# Patient Record
Sex: FEMALE | Employment: UNEMPLOYED | URBAN - NONMETROPOLITAN AREA
[De-identification: names, ages, dates, MRNs, and addresses within clinical notes are randomized per-mention and may not be internally consistent; named-entity substitution may affect disease eponyms.]

---

## 2019-11-19 ENCOUNTER — OFFICE VISIT (OUTPATIENT)
Dept: OTOLARYNGOLOGY | Facility: CLINIC | Age: 59
End: 2019-11-19

## 2019-11-19 ENCOUNTER — PROCEDURE VISIT (OUTPATIENT)
Dept: OTOLARYNGOLOGY | Facility: CLINIC | Age: 59
End: 2019-11-19

## 2019-11-19 VITALS
HEART RATE: 80 BPM | BODY MASS INDEX: 17.68 KG/M2 | HEIGHT: 66 IN | TEMPERATURE: 98.3 F | SYSTOLIC BLOOD PRESSURE: 145 MMHG | WEIGHT: 110 LBS | DIASTOLIC BLOOD PRESSURE: 85 MMHG

## 2019-11-19 DIAGNOSIS — M26.622 ARTHRALGIA OF LEFT TEMPOROMANDIBULAR JOINT: ICD-10-CM

## 2019-11-19 DIAGNOSIS — H90.3 SNHL (SENSORY-NEURAL HEARING LOSS), ASYMMETRICAL: Primary | ICD-10-CM

## 2019-11-19 DIAGNOSIS — M79.18 MYOFASCIAL PAIN ON LEFT SIDE: Primary | ICD-10-CM

## 2019-11-19 DIAGNOSIS — H90.3 HEARING LOSS, SENSORINEURAL, HIGH FREQUENCY, BILATERAL: ICD-10-CM

## 2019-11-19 DIAGNOSIS — H92.02 EAR PAIN, LEFT: ICD-10-CM

## 2019-11-19 DIAGNOSIS — H92.02 OTALGIA, LEFT: ICD-10-CM

## 2019-11-19 PROCEDURE — 99203 OFFICE O/P NEW LOW 30 MIN: CPT | Performed by: OTOLARYNGOLOGY

## 2019-11-19 NOTE — PROGRESS NOTES
Vianca Abel LPN   Patient Intake Note    Review of Systems  Review of Systems   Constitutional: Negative for chills, fatigue and fever.   HENT:        See HPI   Respiratory: Negative for cough, choking and shortness of breath.    Gastrointestinal: Negative for diarrhea, nausea and vomiting.   Neurological: Negative for dizziness, light-headedness and headaches.   Psychiatric/Behavioral: Negative for sleep disturbance.       QUALITY MEASURES    Tobacco Use: Screening and Cessation Intervention  Social History    Tobacco Use      Smoking status: Never Smoker      Smokeless tobacco: Never Used        Vianca Abel LPN  11/19/2019  3:05 PM

## 2019-11-19 NOTE — PROGRESS NOTES
Aren Ashley Jr, MD     Chief Complaint   Patient presents with   • Ear Problem     Pain and itching in right ear        HISTORY OF PRESENT ILLNESS:   Accompanied by:     Irvin Henning is a  59 y.o. female with L ear pain.  She had this last month. She denies any antecedent illness.  She has seen Dentist and told teeth ok. She states jaw not checked by dentist.  PCP treated with antibiotics.  No pain today.  She has intermittent pain L face. She will feel pain in L ear.  Hearing- tested today. No change with pain.  Smoke- none  Drink- none  Bruxing- she is not sure.  She has some issues with chewing.  She has itching ears. She cleans ears    Review of Systems  Reviewed per patient intake note and confirmed with patient    Past History:  History reviewed. No pertinent past medical history.  History reviewed. No pertinent surgical history.  Family History   Problem Relation Age of Onset   • Cancer Brother      Social History     Tobacco Use   • Smoking status: Never Smoker   • Smokeless tobacco: Never Used   Substance Use Topics   • Alcohol use: No     Frequency: Never   • Drug use: Defer     No outpatient medications have been marked as taking for the 11/19/19 encounter (Office Visit) with Aren Ashley Jr., MD.     Allergies:  Patient has no known allergies.        Vital Signs:   Temp:  [98.3 °F (36.8 °C)] 98.3 °F (36.8 °C)  Heart Rate:  [80] 80  BP: (145)/(85) 145/85    EXAMINATION:   CONSTITUTIONAL:    well nourished, well-developed, alert, oriented, in no acute distress     BODY HABITUS:    Normal body habitus    COMMUNICATION:    able to communicate normally, normal voice quality    HEAD:     Normocephalic, without obvious abnormality, atraumatic    FACE:    structure normal, no tenderness present, no lesions/masses, no evidence of trauma    SALIVARY GLANDS:    parotid glands with no tenderness, no swelling, no masses, submandibular glands with normal size, nontender     EYE:    ocular  motility normal, eyelids normal, orbits normal, no proptosis, conjunctiva clear, sclera non-icteric, pupils equal, round, reactive to light and accomodation  Color:   brown    HEARING:    response to conversational voice normal    EARS:    Otoscopic exam   normal pinna with no lesions, Canals normal size and shape, Tympanic membranes normal, Ossicular chain intact, Middle ear clear   EXTERNAL AUDITORY CANALS:           BILATERAL: too clean but otherwise normal     NOSE EXTERNAL:    APPEARANCE: normal, straight, with good projection, no tenderness, no lesions, no tenderness, good nasal support, patent nares    NOSE INTERNAL:    Anterior rhinoscopy  intact mucosa, normal inferior turbinates, septum midline without perforation, secretions clear and normal amount, nares patent, normal nasal airway without obstruction, no lesions    ORAL CAVITY:    Normal lips with no lesions, dentition normal for age, FOM intact without lesions and normal salivary flow, Mucosa intact without lesions, Hard and soft palate normal without lesions    OROPHARYNX:    Direct examination  oropharyngeal mucosa normal, tonsil(s) with normal appearance      NECK:    normal appearance, no masses, no lesions, larynx normal mobility, trachea midline    LYMPH NODES :    no adenopathy    THYROID:    no overt thyromegaly, no tenderness, nodules or mass present on palpation, position midline    CHEST/RESPIRATORY:    respiratory effort normal, no rales, rubs or wheezing, no stridor, normal appearance to chest    CARDIOVASCULAR:    regular rate and rhythm, no murmurs, gallups, no peripheral edema    NEURO/PSYCHIATRIC :    oriented appropriately for age, mood normal, affect appropriate, cranial nerves intact grossly (unless specifically described), gait normal for age     TMJ EXAM:  Tenderness:     Left  Opening:     Deviation Bilateral- none   Clicking/Popping:     Bilateral- none   Pain radiation:     Left- into MM groove  OCCLUSION: Class I:        Bilateral    RESULTS REVIEW:    Audiologic testing reviewed.        ASSESSMENT:      Diagnosis Plan   1. Myofascial pain on left side      Mastication   2. Arthralgia of left temporomandibular joint      Tenderness on Exam   3. Otalgia, left      referred from TMJ   4. Hearing loss, sensorineural, high frequency, bilateral      discussed Hearing aids.           PLAN:     Conservative management.  patient has tenderness over TMJ upon exam.  I recommend TMJ regimen, heat and Aleve.  I will have patient try Mouthguard.  She may need referral back to Dentist for splint if pain persists.  EACs too clean. I will start ear care and see if this resolves itching.  Ear care with oil  TMJ recommendations  Aleve BID  Heat  Mouthguard  Consider hearing aids for hearing  MY CHART:  Patient is Encouraged to enroll in My Chart  Encouraged to review data and findings in My Chart             Patient, Spouse understand(s) and agree(s) with the treatment plan as described.  Return in about 6 weeks (around 12/31/2019) for Recheck L ear pain.       Aren Ashley Jr, MD  11/19/19  3:21 PM

## 2019-11-19 NOTE — PATIENT INSTRUCTIONS
TEMPOROMANDIBULAR JOINT EXERCISES     The temporomandibular joint, or the TMJ, is the jaw joint. This joint can frequently be a source of pain in the head and neck region. Typically because of its location, pain is felt either in area just in front of the ear, or in the ear itself. However, pain in the TMJ can be referred to any part of the head and neck.     An examination by the physician frequently reveals tenderness around the joint. Oftentimes, a crack or pop can also be felt. This may be an indication that the pain is in all actuality coming from the joint. An exhaustive search for a cause is carried out, in an effort to determine the etiology of the pain. Pain can be caused by grinding of teeth at night (bruxism), overuse (gum chewing, ice cracking, over opening mouth), poor dentition and malocclusion (bad bite). In an attempt to be thorough, your physician may involve others who have experience in this field, such as oral surgeons, dentists and pain experts.     Should you be diagnosed with TMJ pain, a course of conservative treatment is indicated, as this works in an overwhelming majority of cases. Because this pain originates from a joint, the treatment is similar to treatment for pain in other joints.  Treatment     Rest:  This is indicated to relieve the pressure on the joint. No chewing gum, tough meat, hard bread, cracking ice in the teeth, or any other activity that places undue stress on the joint. Simply, if it causes it to hurt, stop doing it. This may be required for an unspecified period of time, usually 1 to 2 weeks.     Cold to the area:  This will reduce swelling and pain early in the course.     Heat to the area:  This improves blood flow to the area and speeds healing.     Pain Relievers:  Anti-inflammatory medications, such as aspirin, Motrin, Advil, Nuprin, Aleve or any other products in this category are satisfactory to use in the face of joint pain. Rarely are narcotics required, except  possibly in the acute phase to gain some initial relief.  Dr. Ashley may administer pain blocks to relieve severe pain and spasm. Nerve blocks may also be used.    Recommendations:  ?       Reduce/eliminate caffeinated drinks  ?       Reduce high sugar drinks and foods  ?       Get plenty of rest  ?       Practice relaxation techniques; Yoga, Biofeedback, Freddie Chi, etc.  ?       Exercise for overall fitness  ?       Eat healthy- High fiber, low fat/cholesterol eating, and change eating habits. We recommend Sugar Busters as a lifestyle change for eating.  ?       See your dentist regularly for checkups and bite checks.  Rehabilitation:  Once the acute pain has been controlled, you should begin exercises to strengthen and rehabilitate the TMJ.     Exercises: These should be performed for five minutes at least five times each day     Slowly open the mouth to its fullest extent, even using the fingers to exert gentle pressure.     Open and close the mouth as widely and rapidly as possible.     Open and close the mouth against resistance by placing the open palm underneath the chin, or the fingers on top of the chin.     Move the lower jaw side to side without resistance. Later, add resistance by placing both hands on either side of the jaw.     Push (protrude) the lower jaw without resistance. Later add resistance.     Should the above regimen fail to lead to improvement, your physician will discuss with you other forms of therapy. Since almost all types of TMJ pain respond to conservative therapy, surgery is indicated only after exhausting the rehabilitation. Dr. Ashley does not perform rehabilitative surgery on the temporomandibular joint, but refers patients to an oral surgery who specialize in this type of surgery.     APPLICATION OF HEAT AND ICE    At times, judicious application of heat or ice can accelerate healing, diminish swelling and reduce pain. Dr. Ashley will frequently prescribe heat, ice or both as  part of the treatment of your injury or surgery.     How to apply ice:  Never apply ice directly to the skin. Always place an insulating layer, such as a washcloth or ice bag, between the ice and the skin.  Ice bags can be made of zip lock bags, water and ice, wrapped in a washcloth. Do not fill the bag too full and this will conform well around curves of the body, head and neck.  If your injury has just occurred, remember rest, ice, compression, and elevation (RICE). In an acute injury, ice is best applied for 48 to 72 hours to minimize swelling and pain. Doing this as often as possible (at least 4 times daily, but constantly if possible).     How to apply heat:  Never apply a heating pad while sleeping. This may lead to a burn. Heating pads apply continuous heat that can build up while sleeping.  Hot water bottles are excellent for applying heat.  Make a hot compress by heating a washcloth in the microwave, placing it in a zip lock bag and apply to the affected area.  You can use either dry heat or moist heat, whichever feels the best. Using moist heat will loosen scabbing and crusting, making the scabs easier to remove. This will also unstick eyelids that are stuck together.     Apply heat  for at least 15-20 minutes 4-5 times daily for 3 days  Apply to L face and jaw    After 48 to 72 hours, begin to alternate heat and ice application for 15 minutes each, several times daily.    Aleve 2 times daily, one tablet    EAR CARE: :using oil  Use a hair dryer on low heat and blow into the ear canals 2 times daily to keep ears dry.  DO Not use Q tips or Denia pins, ever!!  Do not use alcohol in the ear canal (this will cause dryness and itching)  NO peroxide or alcohol in ears  Oil: Use Sweet oil, Olive oil, or Mineral oil, purchased over the counter, once a week, Do not use Q tips, You may use a hair dryer on low heat. Blow in ears for 10-15 seconds 2 times daily to dry ear canals or if ear canals are itching.    Buy a  mouthguard for teeth at [facility], your choice.    Thank you for enrolling in [employer]. Please follow the instructions below to securely access your online medical record. [employer] allows you to send messages to your doctor, view your test results, renew your prescriptions, schedule appointments, and more.    How Do I Sign Up?  1. In your Internet browser, go to [email or web address]  2. Click on the Sign Up Now link in the New User? box.   3. Enter your [employer] Activation Code exactly as it appears below. You will not need to use this code after you have completed the sign-up process. If you do not sign up before the expiration date, you must request a new code.  [employer] Activation Code: [email or web address]  Expires: 12/19/2019  3:20 PM    4. Enter the last four digits of your Social Security Number and your Date of Birth as indicated and click Next. You will be taken to the next sign-up page.  5. Create a [employer] username. Think of one that is secure and easy to remember.  6. Create a [employer] password. You can change your password at any time.  7. Choose a security question, enter your answer, and click Next. This can be used to access [employer] if you forget your password.   8. Select your communication preference. Enter a valid e-mail address if you would like to receive e-mail notifications when new information is available in [employer].  9. Click Sign In. You can now view your medical record.     Additional Information  If you have questions, you can e-mail [employer] [email or web address], or call [phone] to talk to our [employer] staff. Remember, [employer] is NOT to be used for urgent needs. For medical emergencies, dial 911.